# Patient Record
Sex: MALE | Race: WHITE | NOT HISPANIC OR LATINO | Employment: FULL TIME | URBAN - METROPOLITAN AREA
[De-identification: names, ages, dates, MRNs, and addresses within clinical notes are randomized per-mention and may not be internally consistent; named-entity substitution may affect disease eponyms.]

---

## 2021-02-08 ENCOUNTER — IMMUNIZATION (OUTPATIENT)
Dept: VACCINE CLINIC | Facility: HOSPITAL | Age: 31
End: 2021-02-08

## 2021-02-08 PROCEDURE — 91300 HC SARSCOV02 VAC 30MCG/0.3ML IM: CPT | Performed by: INTERNAL MEDICINE

## 2021-02-08 PROCEDURE — 0001A: CPT | Performed by: INTERNAL MEDICINE

## 2021-03-01 ENCOUNTER — IMMUNIZATION (OUTPATIENT)
Dept: VACCINE CLINIC | Facility: HOSPITAL | Age: 31
End: 2021-03-01

## 2021-03-01 PROCEDURE — 91300 HC SARSCOV02 VAC 30MCG/0.3ML IM: CPT | Performed by: INTERNAL MEDICINE

## 2021-03-01 PROCEDURE — 0002A: CPT | Performed by: INTERNAL MEDICINE

## 2022-07-28 ENCOUNTER — APPOINTMENT (OUTPATIENT)
Dept: GENERAL RADIOLOGY | Facility: HOSPITAL | Age: 32
End: 2022-07-28

## 2022-07-28 ENCOUNTER — HOSPITAL ENCOUNTER (EMERGENCY)
Facility: HOSPITAL | Age: 32
Discharge: HOME | End: 2022-07-29
Attending: EMERGENCY MEDICINE | Admitting: EMERGENCY MEDICINE

## 2022-07-28 DIAGNOSIS — I48.91 ATRIAL FIBRILLATION, UNSPECIFIED TYPE: Primary | ICD-10-CM

## 2022-07-28 LAB
ALBUMIN SERPL-MCNC: 4.5 G/DL (ref 3.5–5.2)
ALBUMIN/GLOB SERPL: 2 G/DL
ALP SERPL-CCNC: 76 U/L (ref 39–117)
ALT SERPL W P-5'-P-CCNC: 29 U/L (ref 1–41)
ANION GAP SERPL CALCULATED.3IONS-SCNC: 11.4 MMOL/L (ref 5–15)
AST SERPL-CCNC: 33 U/L (ref 1–40)
BASOPHILS # BLD AUTO: 0.07 10*3/MM3 (ref 0–0.2)
BASOPHILS NFR BLD AUTO: 0.5 % (ref 0–1.5)
BILIRUB SERPL-MCNC: 0.6 MG/DL (ref 0–1.2)
BUN SERPL-MCNC: 16 MG/DL (ref 6–20)
BUN/CREAT SERPL: 15.8 (ref 7–25)
CALCIUM SPEC-SCNC: 9.1 MG/DL (ref 8.6–10.5)
CHLORIDE SERPL-SCNC: 104 MMOL/L (ref 98–107)
CO2 SERPL-SCNC: 24.6 MMOL/L (ref 22–29)
CREAT SERPL-MCNC: 1.01 MG/DL (ref 0.76–1.27)
DEPRECATED RDW RBC AUTO: 37.2 FL (ref 37–54)
EGFRCR SERPLBLD CKD-EPI 2021: 100.7 ML/MIN/1.73
EOSINOPHIL # BLD AUTO: 0.26 10*3/MM3 (ref 0–0.4)
EOSINOPHIL NFR BLD AUTO: 2 % (ref 0.3–6.2)
ERYTHROCYTE [DISTWIDTH] IN BLOOD BY AUTOMATED COUNT: 12.9 % (ref 12.3–15.4)
GLOBULIN UR ELPH-MCNC: 2.3 GM/DL
GLUCOSE SERPL-MCNC: 115 MG/DL (ref 65–99)
HCT VFR BLD AUTO: 46.2 % (ref 37.5–51)
HGB BLD-MCNC: 16.1 G/DL (ref 13–17.7)
IMM GRANULOCYTES # BLD AUTO: 0.08 10*3/MM3 (ref 0–0.05)
IMM GRANULOCYTES NFR BLD AUTO: 0.6 % (ref 0–0.5)
LYMPHOCYTES # BLD AUTO: 2.42 10*3/MM3 (ref 0.7–3.1)
LYMPHOCYTES NFR BLD AUTO: 18.6 % (ref 19.6–45.3)
MAGNESIUM SERPL-MCNC: 2.1 MG/DL (ref 1.6–2.6)
MCH RBC QN AUTO: 28.5 PG (ref 26.6–33)
MCHC RBC AUTO-ENTMCNC: 34.8 G/DL (ref 31.5–35.7)
MCV RBC AUTO: 81.9 FL (ref 79–97)
MONOCYTES # BLD AUTO: 1.04 10*3/MM3 (ref 0.1–0.9)
MONOCYTES NFR BLD AUTO: 8 % (ref 5–12)
NEUTROPHILS NFR BLD AUTO: 70.3 % (ref 42.7–76)
NEUTROPHILS NFR BLD AUTO: 9.13 10*3/MM3 (ref 1.7–7)
NRBC BLD AUTO-RTO: 0.1 /100 WBC (ref 0–0.2)
PLATELET # BLD AUTO: 282 10*3/MM3 (ref 140–450)
PMV BLD AUTO: 10.5 FL (ref 6–12)
POTASSIUM SERPL-SCNC: 3.7 MMOL/L (ref 3.5–5.2)
PROT SERPL-MCNC: 6.8 G/DL (ref 6–8.5)
RBC # BLD AUTO: 5.64 10*6/MM3 (ref 4.14–5.8)
SODIUM SERPL-SCNC: 140 MMOL/L (ref 136–145)
TROPONIN T SERPL-MCNC: <0.01 NG/ML (ref 0–0.03)
WBC NRBC COR # BLD: 13 10*3/MM3 (ref 3.4–10.8)

## 2022-07-28 PROCEDURE — 93005 ELECTROCARDIOGRAM TRACING: CPT | Performed by: EMERGENCY MEDICINE

## 2022-07-28 PROCEDURE — 71045 X-RAY EXAM CHEST 1 VIEW: CPT

## 2022-07-28 PROCEDURE — 93010 ELECTROCARDIOGRAM REPORT: CPT | Performed by: INTERNAL MEDICINE

## 2022-07-28 PROCEDURE — 84484 ASSAY OF TROPONIN QUANT: CPT | Performed by: EMERGENCY MEDICINE

## 2022-07-28 PROCEDURE — 80053 COMPREHEN METABOLIC PANEL: CPT | Performed by: EMERGENCY MEDICINE

## 2022-07-28 PROCEDURE — 99284 EMERGENCY DEPT VISIT MOD MDM: CPT

## 2022-07-28 PROCEDURE — 36415 COLL VENOUS BLD VENIPUNCTURE: CPT

## 2022-07-28 PROCEDURE — 85025 COMPLETE CBC W/AUTO DIFF WBC: CPT | Performed by: EMERGENCY MEDICINE

## 2022-07-28 PROCEDURE — 83735 ASSAY OF MAGNESIUM: CPT | Performed by: EMERGENCY MEDICINE

## 2022-07-28 RX ORDER — TRIAMCINOLONE ACETONIDE 55 UG/1
2 SPRAY, METERED NASAL DAILY
COMMUNITY

## 2022-07-28 RX ORDER — CETIRIZINE HYDROCHLORIDE 10 MG/1
10 TABLET ORAL DAILY
COMMUNITY

## 2022-07-28 RX ORDER — SODIUM CHLORIDE 0.9 % (FLUSH) 0.9 %
10 SYRINGE (ML) INJECTION AS NEEDED
Status: DISCONTINUED | OUTPATIENT
Start: 2022-07-28 | End: 2022-07-29 | Stop reason: HOSPADM

## 2022-07-29 VITALS
BODY MASS INDEX: 27.77 KG/M2 | OXYGEN SATURATION: 94 % | RESPIRATION RATE: 18 BRPM | HEIGHT: 78 IN | TEMPERATURE: 97 F | HEART RATE: 65 BPM | DIASTOLIC BLOOD PRESSURE: 77 MMHG | SYSTOLIC BLOOD PRESSURE: 110 MMHG | WEIGHT: 240 LBS

## 2022-07-29 LAB
HOLD SPECIMEN: NORMAL
QT INTERVAL: 337 MS
QT INTERVAL: 355 MS
TROPONIN T SERPL-MCNC: <0.01 NG/ML (ref 0–0.03)
WHOLE BLOOD HOLD SPECIMEN: NORMAL

## 2022-07-29 PROCEDURE — 93005 ELECTROCARDIOGRAM TRACING: CPT | Performed by: EMERGENCY MEDICINE

## 2022-07-29 PROCEDURE — 93010 ELECTROCARDIOGRAM REPORT: CPT | Performed by: INTERNAL MEDICINE

## 2022-07-29 PROCEDURE — 84484 ASSAY OF TROPONIN QUANT: CPT | Performed by: EMERGENCY MEDICINE

## 2022-08-11 ENCOUNTER — OFFICE VISIT (OUTPATIENT)
Dept: CARDIOLOGY | Facility: CLINIC | Age: 32
End: 2022-08-11

## 2022-08-11 VITALS
HEIGHT: 78 IN | DIASTOLIC BLOOD PRESSURE: 78 MMHG | HEART RATE: 63 BPM | BODY MASS INDEX: 28.11 KG/M2 | WEIGHT: 243 LBS | SYSTOLIC BLOOD PRESSURE: 126 MMHG

## 2022-08-11 DIAGNOSIS — I48.0 AF (PAROXYSMAL ATRIAL FIBRILLATION): Primary | ICD-10-CM

## 2022-08-11 PROCEDURE — 99204 OFFICE O/P NEW MOD 45 MIN: CPT | Performed by: INTERNAL MEDICINE

## 2022-08-11 PROCEDURE — 93000 ELECTROCARDIOGRAM COMPLETE: CPT | Performed by: INTERNAL MEDICINE

## 2022-08-11 NOTE — PROGRESS NOTES
Date of Office Visit: 2022  Encounter Provider: Aleksey Ryan MD  Place of Service: [facility] CARDIOLOGY  Patient Name: Wilian Merritt  :1990    Chief Complaint   Patient presents with   • Atrial Fibrillation     New Patient Consult   • Palpitations   :     HPI: Wilian Merritt is a 32 y.o. male who presents today for atrial fibrillation.    The patient was playing a game of softball, when he noticed he felt very short of breath after running the bases.  He checked his apple watch and was in atrial fibrillation.  This was approximately at 6 PM.  He presented to the emergency room, and EKG confirmed atrial fibrillation.  His [id number] score is 0, so no anticoagulation was recommended, and he was advised to follow-up in cardiology clinic.  He converted to sinus rhythm in the next day.    He is not aware of any family history of atrial fibrillation or heart arrhythmias.  He is not aware of any family history of inherited conditions.    He is extremely tall, which she reports is somewhat of an outlier for his family.  He is 6 7.    He is not aware of any health conditions other than asthma    He denies any similar episodes before or since.          Past Medical History:   Diagnosis Date   • Asthma        Past Surgical History:   Procedure Laterality Date   • MANDIBLE SURGERY     • SHOULDER SURGERY     • WRIST SURGERY         Social History     Socioeconomic History   • Marital status:    Tobacco Use   • Smoking status: Never Smoker   • Smokeless tobacco: Never Used   Vaping Use   • Vaping Use: Never used   Substance and Sexual Activity   • Alcohol use: Yes     Comment: socially // caffeine 10-20oz daily   • Drug use: Never   • Sexual activity: Yes     Partners: Female       History reviewed. No pertinent family history.    Review of Systems   Constitutional: Negative for malaise/fatigue.   HENT: Negative.    Eyes: Negative.    Cardiovascular: Negative for chest  "pain, dyspnea on exertion, leg swelling and near-syncope.   Respiratory: Negative for cough and shortness of breath.    Endocrine: Negative.    Hematologic/Lymphatic: Negative.    Skin: Negative.    Musculoskeletal: Negative.    Gastrointestinal: Negative.    Genitourinary: Negative.    Neurological: Negative.  Negative for weakness.   Psychiatric/Behavioral: Negative.    Allergic/Immunologic: Negative.        No Known Allergies      Current Outpatient Medications:   •  cetirizine (zyrTEC) 10 MG tablet, Take 10 mg by mouth Daily., Disp: , Rfl:   •  Triamcinolone Acetonide (NASACORT) 55 MCG/ACT nasal inhaler, 2 sprays into the nostril(s) as directed by provider Daily., Disp: , Rfl:       Objective:     Vitals:    08/11/22 0847   BP: 126/78   Pulse: 63   Weight: 110 kg (243 lb)   Height: 200.7 cm (79\")     Body mass index is 27.38 kg/m².    PHYSICAL EXAM:    Vitals and nursing note reviewed.   Constitutional:       Appearance: Normal and healthy appearance.   HENT:      Head: Normocephalic and atraumatic.    Mouth/Throat:      Pharynx: Oropharynx is clear.   Pulmonary:      Effort: Pulmonary effort is normal.      Breath sounds: Normal breath sounds.   Cardiovascular:      PMI at left midclavicular line. Normal rate. Regular rhythm.   Edema:     Peripheral edema absent.   Skin:     General: Skin is warm.   Neurological:      General: No focal deficit present.      Mental Status: Alert, oriented to person, place, and time and oriented to person, place and time.   Psychiatric:         Attention and Perception: Attention and perception normal.         Mood and Affect: Mood and affect normal.         Behavior: Behavior is cooperative.             ECG 12 Lead    Date/Time: 8/11/2022 9:43 AM  Performed by: Aleksey Ryan MD  Authorized by: Aleksey Ryan MD   Comparison: compared with previous ECG from 8/11/2022  Similar to previous ECG  Rhythm: sinus rhythm        I reviewed his emergency room note.    I " reviewed his labs.  They were only notable for a mildly elevated white count.      Assessment:       Diagnosis Plan   1. AF (paroxysmal atrial fibrillation) ([facility])  Adult Transthoracic Echo Complete w/ Color, Spectral and Contrast if Necessary Per Protocol          Plan:       Paroxysmal atrial fibrillation, 1 episode.  [id number] score of 0.  Moderate symptoms.  We will continue observation for now.  We will get an echo to rule out structural heart disease.  He is extremely tall, and have some findings which could be suggestive of Marfan syndrome.  I am going to get genetic testing for him.    As always, it has been a pleasure to participate in your patient's care.      Sincerely,         Aleksey Ryan MD

## 2022-08-25 ENCOUNTER — HOSPITAL ENCOUNTER (OUTPATIENT)
Dept: CARDIOLOGY | Facility: HOSPITAL | Age: 32
Discharge: HOME | End: 2022-08-25

## 2022-08-25 VITALS
BODY MASS INDEX: 28.11 KG/M2 | SYSTOLIC BLOOD PRESSURE: 116 MMHG | DIASTOLIC BLOOD PRESSURE: 72 MMHG | WEIGHT: 243 LBS | HEART RATE: 62 BPM | HEIGHT: 78 IN

## 2022-08-25 DIAGNOSIS — I48.0 AF (PAROXYSMAL ATRIAL FIBRILLATION): ICD-10-CM

## 2022-08-25 LAB
AORTIC ARCH: 2.8 CM
ASCENDING AORTA: 3.3 CM
BH CV ECHO MEAS - ACS: 2.7 CM
BH CV ECHO MEAS - AO MAX PG: 6.5 MMHG
BH CV ECHO MEAS - AO MEAN PG: 3.4 MMHG
BH CV ECHO MEAS - AO ROOT DIAM: 3.9 CM
BH CV ECHO MEAS - AO V2 MAX: 127.6 CM/SEC
BH CV ECHO MEAS - AO V2 VTI: 25.2 CM
BH CV ECHO MEAS - AVA(I,D): 4.8 CM2
BH CV ECHO MEAS - EDV(CUBED): 181.2 ML
BH CV ECHO MEAS - EDV(MOD-SP2): 187 ML
BH CV ECHO MEAS - EDV(MOD-SP4): 177 ML
BH CV ECHO MEAS - EF(MOD-BP): 68.7 %
BH CV ECHO MEAS - EF(MOD-SP2): 69 %
BH CV ECHO MEAS - EF(MOD-SP4): 67.8 %
BH CV ECHO MEAS - ESV(CUBED): 30.2 ML
BH CV ECHO MEAS - ESV(MOD-SP2): 58 ML
BH CV ECHO MEAS - ESV(MOD-SP4): 57 ML
BH CV ECHO MEAS - FS: 44.9 %
BH CV ECHO MEAS - IVS/LVPW: 0.94 CM
BH CV ECHO MEAS - IVSD: 0.89 CM
BH CV ECHO MEAS - LAT PEAK E' VEL: 19.5 CM/SEC
BH CV ECHO MEAS - LV DIASTOLIC VOL/BSA (35-75): 71.5 CM2
BH CV ECHO MEAS - LV MASS(C)D: 200.6 GRAMS
BH CV ECHO MEAS - LV MAX PG: 3.8 MMHG
BH CV ECHO MEAS - LV MEAN PG: 2.24 MMHG
BH CV ECHO MEAS - LV SYSTOLIC VOL/BSA (12-30): 23 CM2
BH CV ECHO MEAS - LV V1 MAX: 97.3 CM/SEC
BH CV ECHO MEAS - LV V1 VTI: 21.5 CM
BH CV ECHO MEAS - LVIDD: 5.7 CM
BH CV ECHO MEAS - LVIDS: 3.1 CM
BH CV ECHO MEAS - LVOT AREA: 5.6 CM2
BH CV ECHO MEAS - LVOT DIAM: 2.7 CM
BH CV ECHO MEAS - LVPWD: 0.95 CM
BH CV ECHO MEAS - MED PEAK E' VEL: 13.5 CM/SEC
BH CV ECHO MEAS - MR MAX PG: 40 MMHG
BH CV ECHO MEAS - MR MAX VEL: 316.3 CM/SEC
BH CV ECHO MEAS - MV A DUR: 0.11 SEC
BH CV ECHO MEAS - MV A MAX VEL: 47.6 CM/SEC
BH CV ECHO MEAS - MV DEC SLOPE: 371.7 CM/SEC2
BH CV ECHO MEAS - MV DEC TIME: 0.21 MSEC
BH CV ECHO MEAS - MV E MAX VEL: 90.8 CM/SEC
BH CV ECHO MEAS - MV E/A: 1.91
BH CV ECHO MEAS - MV MAX PG: 4.6 MMHG
BH CV ECHO MEAS - MV MEAN PG: 0.98 MMHG
BH CV ECHO MEAS - MV P1/2T: 84.5 MSEC
BH CV ECHO MEAS - MV V2 VTI: 35 CM
BH CV ECHO MEAS - MVA(P1/2T): 2.6 CM2
BH CV ECHO MEAS - MVA(VTI): 3.4 CM2
BH CV ECHO MEAS - PA ACC TIME: 0.12 SEC
BH CV ECHO MEAS - PA PR(ACCEL): 23.1 MMHG
BH CV ECHO MEAS - PA V2 MAX: 105.9 CM/SEC
BH CV ECHO MEAS - PULM A REVS DUR: 0.13 SEC
BH CV ECHO MEAS - PULM A REVS VEL: 27.5 CM/SEC
BH CV ECHO MEAS - PULM DIAS VEL: 47.9 CM/SEC
BH CV ECHO MEAS - PULM S/D: 0.91
BH CV ECHO MEAS - PULM SYS VEL: 43.4 CM/SEC
BH CV ECHO MEAS - QP/QS: 0.83
BH CV ECHO MEAS - RV MAX PG: 2.02 MMHG
BH CV ECHO MEAS - RV V1 MAX: 71.1 CM/SEC
BH CV ECHO MEAS - RV V1 VTI: 18.1 CM
BH CV ECHO MEAS - RVOT DIAM: 2.7 CM
BH CV ECHO MEAS - SI(MOD-SP2): 52.1 ML/M2
BH CV ECHO MEAS - SI(MOD-SP4): 48.5 ML/M2
BH CV ECHO MEAS - SUP REN AO DIAM: 2.2 CM
BH CV ECHO MEAS - SV(LVOT): 120.3 ML
BH CV ECHO MEAS - SV(MOD-SP2): 129 ML
BH CV ECHO MEAS - SV(MOD-SP4): 120 ML
BH CV ECHO MEAS - SV(RVOT): 100 ML
BH CV ECHO MEAS - TAPSE (>1.6): 3.1 CM
BH CV ECHO MEASUREMENTS AVERAGE E/E' RATIO: 5.5
BH CV XLRA - RV BASE: 3 CM
BH CV XLRA - RV LENGTH: 9.7 CM
BH CV XLRA - RV MID: 2.7 CM
BH CV XLRA - TDI S': 15.7 CM/SEC
LEFT ATRIUM VOLUME INDEX: 22.5 ML/M2
LV EF 2D ECHO EST: 69 %
MAXIMAL PREDICTED HEART RATE: 187 BPM
SINUS: 3.5 CM
STJ: 3.1 CM
STRESS TARGET HR: 159 BPM

## 2022-08-25 PROCEDURE — 93306 TTE W/DOPPLER COMPLETE: CPT | Performed by: INTERNAL MEDICINE

## 2022-08-25 PROCEDURE — 93306 TTE W/DOPPLER COMPLETE: CPT

## 2022-08-26 ENCOUNTER — TELEPHONE (OUTPATIENT)
Dept: CARDIOLOGY | Facility: CLINIC | Age: 32
End: 2022-08-26

## 2022-08-26 NOTE — TELEPHONE ENCOUNTER
----- Message from DREAD Zacarias sent at 8/26/2022  3:09 PM EDT -----  Completely normal echo  ----- Message -----  From: Basia Zamora MD  Sent: 8/25/2022  12:43 PM EDT  To: Aleksey Ryan MD

## 2022-09-13 ENCOUNTER — TELEPHONE (OUTPATIENT)
Dept: CARDIOLOGY | Facility: CLINIC | Age: 32
End: 2022-09-13

## 2023-08-15 ENCOUNTER — OFFICE VISIT (OUTPATIENT)
Dept: CARDIOLOGY | Facility: CLINIC | Age: 33
End: 2023-08-15
Payer: COMMERCIAL

## 2023-08-15 VITALS
SYSTOLIC BLOOD PRESSURE: 130 MMHG | BODY MASS INDEX: 26.27 KG/M2 | HEIGHT: 78 IN | HEART RATE: 53 BPM | DIASTOLIC BLOOD PRESSURE: 86 MMHG | WEIGHT: 227 LBS

## 2023-08-15 DIAGNOSIS — R94.31 ABNORMAL EKG: Primary | ICD-10-CM

## 2023-08-15 NOTE — PROGRESS NOTES
Date of Office Visit: 08/15/2023  Encounter Provider: DREAD Gomez  Place of Service: [facility] CARDIOLOGY  Patient Name: Wilian Merritt  :1990    Chief Complaint   Patient presents with    paroxysmal AFIB   :     HPI: Wilian Merritt is a 33 y.o. male who follows with Dr. Ryan. He has one lifetime episode of atrial fibrillation with no recurrence.     Saw Dr. Ryan last year after he went to ED with sudden onset of palpitations while running bases during softball.     EKG in ED showed AF. Converted with IV BB.     Saw Dr. Ryan in follow up. No recurrence. Echo showed normal structural heart disease.                   Presents today for follow up appt.     Since last visit he has done well.     He has not had any recurrent AF.     He has no complaints or concerns in office.     Echo showed normal LVEF.     He exercises without issues.     EKG shows NSR.      Past Medical History:   Diagnosis Date    Asthma        Past Surgical History:   Procedure Laterality Date    MANDIBLE SURGERY      SHOULDER SURGERY      WRIST SURGERY         Social History     Socioeconomic History    Marital status:    Tobacco Use    Smoking status: Never    Smokeless tobacco: Never   Vaping Use    Vaping Use: Never used   Substance and Sexual Activity    Alcohol use: Yes     Comment: socially // caffeine 10-20oz daily    Drug use: Never    Sexual activity: Yes     Partners: Female       Family History   Problem Relation Age of Onset    No Known Problems Mother     No Known Problems Father     No Known Problems Sister     No Known Problems Brother     No Known Problems Son        Review of Systems   Constitutional: Negative for chills, fever and malaise/fatigue.   Cardiovascular:  Negative for chest pain, dyspnea on exertion, leg swelling, near-syncope, orthopnea, palpitations, paroxysmal nocturnal dyspnea and syncope.   Respiratory:  Negative for cough and shortness of breath.   "  Hematologic/Lymphatic: Negative.    Musculoskeletal:  Negative for joint pain, joint swelling and myalgias.   Gastrointestinal:  Negative for abdominal pain, diarrhea, melena, nausea and vomiting.   Genitourinary:  Negative for frequency and hematuria.   Neurological:  Negative for light-headedness, numbness, paresthesias and seizures.   Allergic/Immunologic: Negative.    All other systems reviewed and are negative.    No Known Allergies      Current Outpatient Medications:     Triamcinolone Acetonide (NASACORT) 55 MCG/ACT nasal inhaler, 2 sprays into the nostril(s) as directed by provider Daily., Disp: , Rfl:     cetirizine (zyrTEC) 10 MG tablet, Take 1 tablet by mouth Daily. (Patient not taking: Reported on 8/15/2023), Disp: , Rfl:       Objective:     Vitals:    08/15/23 1030   BP: 130/86   Pulse: 53   Weight: 103 kg (227 lb)   Height: 200.7 cm (79\")     Body mass index is 25.57 kg/mý.    PHYSICAL EXAM:    Vitals Reviewed.   General Appearance: No acute distress, well developed and well nourished.   Eyes: Conjunctiva and lids: No erythema, swelling, or discharge. Sclera non-icteric.   HENT: Atraumatic, normocephalic. External eyes, ears, and nose normal.   Respiratory: No signs of respiratory distress. Respiration rhythm and depth normal.   Clear to auscultation. No rales, crackles, rhonchi, or wheezing auscultated.   Cardiovascular:  Heart Rate and Rhythm: Normal, Heart Sounds: Normal S1 and S2. No S3 or S4 noted.  Gastrointestinal:  Abdomen soft, non-distended, non-tender.   Musculoskeletal: Normal movement of extremities  Skin: Warm and dry.   Psychiatric: Patient alert and oriented to person, place, and time. Speech and behavior appropriate. Normal mood and affect.       ECG 12 Lead    Date/Time: 8/15/2023 3:13 PM  Performed by: Zaire Andres APRN  Authorized by: Zaire Andres APRN   Comparison: compared with previous ECG   Similar to previous ECG  Rhythm: sinus rhythm          Assessment:       " Diagnosis Plan   1. Abnormal EKG               Plan:   PAF--- he had one episode of AF in 2022 during softball/exertion. No recurrence since. Echo was normal. He has no complaints or concerns.       Follow up in one year.         As always, it has been a pleasure to participate in your patient's care.      Sincerely,         DREAD Gomez

## 2024-01-16 ENCOUNTER — OFFICE VISIT (OUTPATIENT)
Dept: INTERNAL MEDICINE | Facility: CLINIC | Age: 34
End: 2024-01-16
Payer: COMMERCIAL

## 2024-01-16 VITALS
WEIGHT: 240.8 LBS | SYSTOLIC BLOOD PRESSURE: 122 MMHG | BODY MASS INDEX: 27.86 KG/M2 | HEART RATE: 94 BPM | HEIGHT: 78 IN | DIASTOLIC BLOOD PRESSURE: 80 MMHG | TEMPERATURE: 98 F | OXYGEN SATURATION: 97 %

## 2024-01-16 DIAGNOSIS — J45.40 MODERATE PERSISTENT ASTHMA WITHOUT COMPLICATION: ICD-10-CM

## 2024-01-16 DIAGNOSIS — Z13.228 SCREENING FOR METABOLIC DISORDER: ICD-10-CM

## 2024-01-16 DIAGNOSIS — Z00.00 ENCOUNTER FOR ANNUAL PHYSICAL EXAM: Primary | ICD-10-CM

## 2024-01-16 DIAGNOSIS — Z13.220 SCREENING FOR CHOLESTEROL LEVEL: ICD-10-CM

## 2024-01-16 DIAGNOSIS — Z11.59 ENCOUNTER FOR HEPATITIS C SCREENING TEST FOR LOW RISK PATIENT: ICD-10-CM

## 2024-01-16 DIAGNOSIS — Z83.49 FAMILY HISTORY OF GRAVES' DISEASE: ICD-10-CM

## 2024-01-16 PROCEDURE — 99395 PREV VISIT EST AGE 18-39: CPT | Performed by: STUDENT IN AN ORGANIZED HEALTH CARE EDUCATION/TRAINING PROGRAM

## 2024-01-16 RX ORDER — ALBUTEROL SULFATE 90 UG/1
2 AEROSOL, METERED RESPIRATORY (INHALATION) EVERY 4 HOURS PRN
Qty: 6.7 G | Refills: 3 | Status: SHIPPED | OUTPATIENT
Start: 2024-01-16

## 2024-01-16 NOTE — ASSESSMENT & PLAN NOTE
Healthcare Maintenance   Vaccines: recommend influenza. Will discuss PCV at next appt given asthma hx  -PHQ score: 0  -Counseled on importance of maintaining healthy diet and routine exercise. Encouraged 150 min exercise per week.  -Blood pressure goal: <130/80  -Metabolic screening: CMP, A1c, lipid today

## 2024-01-16 NOTE — PROGRESS NOTES
New Patient Office Visit      Patient Name: Wilian Merritt  : 1990   MRN: [id number]   Care Team: Patient Care Team:  Ramon aCstelan MD as PCP - General (Internal Medicine)    Chief Complaint:    Chief Complaint   Patient presents with    Annual Exam    Establish Care    Asthma       History of Present Illness: Wilian Merritt is a 33 y.o. male who is here today as a new patient to establish care for his annual physical and for management of chronic asthma    He was previously on Breo-Ellipta for his asthma and has not been on this for the past year after moving from [place] to [place]. He reports not having any recent exacerbations, and has previous albuterol inhaler however has not had to use it and believes it is likely .     Medical history is otherwise notable for isolated episode of atrial fibrillation in , which resolved spontaneously and had a follow-up with cardiology with echo that showed no evidence of structural heart disease.  Has not had any recurrence of symptoms      Subjective      Review of Systems:   Review of Systems   Constitutional:  Negative for chills, fever and unexpected weight loss.   HENT:  Negative for congestion, postnasal drip and trouble swallowing.    Eyes:  Negative for blurred vision, double vision and pain.   Respiratory:          Hx of asthma   Cardiovascular:  Negative for chest pain and palpitations.   Gastrointestinal:  Negative for abdominal pain, blood in stool, diarrhea, nausea and vomiting.   Genitourinary:  Negative for dysuria, hematuria and urgency.   Musculoskeletal:  Negative for arthralgias, joint swelling and myalgias.   Skin:  Negative for rash, skin lesions and wound.   Neurological:  Negative for dizziness, weakness and headache.   Psychiatric/Behavioral:  Negative for depressed mood and stress. The patient is not nervous/anxious.     - See HPI    Past Medical History:   Past Medical History:   Diagnosis Date    Asthma        Past  "Surgical History:   Past Surgical History:   Procedure Laterality Date    MANDIBLE SURGERY      SHOULDER SURGERY      WRIST SURGERY         Family History:   Family History   Problem Relation Age of Onset    No Known Problems Mother     No Known Problems Father     No Known Problems Sister     No Known Problems Brother     No Known Problems Son        Social History:   Social History     Socioeconomic History    Marital status:    Tobacco Use    Smoking status: Never     Passive exposure: Never    Smokeless tobacco: Never   Vaping Use    Vaping Use: Never used   Substance and Sexual Activity    Alcohol use: Yes     Comment: socially // caffeine 10-20oz daily    Drug use: Never    Sexual activity: Yes     Partners: Female       Tobacco History:   Social History     Tobacco Use   Smoking Status Never    Passive exposure: Never   Smokeless Tobacco Never       Medications:     Current Outpatient Medications:     Triamcinolone Acetonide (NASACORT) 55 MCG/ACT nasal inhaler, 2 sprays into the nostril(s) as directed by provider Daily., Disp: , Rfl:     albuterol sulfate  (90 Base) MCG/ACT inhaler, Inhale 2 puffs Every 4 (Four) Hours As Needed for Wheezing., Disp: 6.7 g, Rfl: 3    Fluticasone Furoate-Vilanterol 50-25 MCG/ACT aerosol powder , Inhale 1 puff Daily., Disp: 60 each, Rfl: 3    Allergies:   No Known Allergies    Objective     Physical Exam:  Vital Signs:   Vitals:    01/16/24 1504   BP: 122/80   Pulse: 94   Temp: 98 °F (36.7 °C)   TempSrc: Temporal   SpO2: 97%   Weight: 109 kg (240 lb 12.8 oz)   Height: 200.7 cm (79\")     Body mass index is 27.13 kg/m².       Physical Exam  Vitals reviewed.   Constitutional:       General: He is not in acute distress.     Appearance: Normal appearance.   HENT:      Head: Normocephalic and atraumatic.      Right Ear: External ear normal.      Left Ear: External ear normal.      Nose: Nose normal. No rhinorrhea.   Eyes:      General:         Right eye: No discharge.    "      Left eye: No discharge.      Extraocular Movements: Extraocular movements intact.      Conjunctiva/sclera: Conjunctivae normal.   Cardiovascular:      Rate and Rhythm: Normal rate and regular rhythm.      Heart sounds: Normal heart sounds.   Pulmonary:      Effort: Pulmonary effort is normal. No respiratory distress.      Breath sounds: Normal breath sounds. No wheezing.   Abdominal:      General: Abdomen is flat. There is no distension.      Palpations: Abdomen is soft.   Musculoskeletal:         General: No swelling or deformity. Normal range of motion.      Cervical back: Normal range of motion and neck supple.   Skin:     General: Skin is warm and dry.   Neurological:      General: No focal deficit present.      Mental Status: He is alert and oriented to person, place, and time.      Motor: No weakness.      Gait: Gait normal.   Psychiatric:         Mood and Affect: Mood normal.         Behavior: Behavior normal.         Assessment / Plan      Assessment/Plan:   Problems Addressed This Visit  Diagnoses and all orders for this visit:    1. Encounter for annual physical exam (Primary)  Assessment & Plan:  Healthcare Maintenance   Vaccines: recommend influenza. Will discuss PCV at next appt given asthma hx  -PHQ score: 0  -Counseled on importance of maintaining healthy diet and routine exercise. Encouraged 150 min exercise per week.  -Blood pressure goal: <130/80  -Metabolic screening: CMP, A1c, lipid today      Orders:  -     Comprehensive Metabolic Panel  -     Hemoglobin A1c  -     Lipid Panel  -     Hepatitis C Antibody  -     TSH Rfx On Abnormal To Free T4    2. Screening for metabolic disorder  -     Comprehensive Metabolic Panel  -     Hemoglobin A1c    3. Encounter for hepatitis C screening test for low risk patient  -     Hepatitis C Antibody    4. Screening for cholesterol level  -     Lipid Panel    5. Moderate persistent asthma without complication  Assessment & Plan:  Asthma is  chronic, no recent  exacerbations however reported persistent sx in past responsive to Breo-Ellipta .  Will refill Breo-Ellipta and send in albuterol prn as rescue inhaler to be used as needed     Orders:  -     Fluticasone Furoate-Vilanterol 50-25 MCG/ACT aerosol powder ; Inhale 1 puff Daily.  Dispense: 60 each; Refill: 3  -     albuterol sulfate  (90 Base) MCG/ACT inhaler; Inhale 2 puffs Every 4 (Four) Hours As Needed for Wheezing.  Dispense: 6.7 g; Refill: 3    6. Family history of Graves' disease  -     TSH Rfx On Abnormal To Free T4          Plan of care reviewed with patient at the conclusion of today's visit. Education was provided regarding diagnosis and management.  Patient verbalizes understanding of and agreement with management plan.      Follow Up:   Return in about 1 year (around 1/16/2025) for Annual physical.          MD GRIFFIN Herndon [facility]  [address] [place] [phone]  Fax [phone]           Home

## 2024-01-16 NOTE — ASSESSMENT & PLAN NOTE
Asthma is  chronic, no recent exacerbations however reported persistent sx in past responsive to Breo-Ellipta .  Will refill Breo-Ellipta and send in albuterol prn as rescue inhaler to be used as needed

## 2024-01-17 LAB
ALBUMIN SERPL-MCNC: 4.6 G/DL (ref 3.5–5.2)
ALBUMIN/GLOB SERPL: 2 G/DL
ALP SERPL-CCNC: 77 U/L (ref 39–117)
ALT SERPL-CCNC: 13 U/L (ref 1–41)
AST SERPL-CCNC: 19 U/L (ref 1–40)
BILIRUB SERPL-MCNC: 0.6 MG/DL (ref 0–1.2)
BUN SERPL-MCNC: 16 MG/DL (ref 6–20)
BUN/CREAT SERPL: 13.8 (ref 7–25)
CALCIUM SERPL-MCNC: 9.3 MG/DL (ref 8.6–10.5)
CHLORIDE SERPL-SCNC: 103 MMOL/L (ref 98–107)
CHOLEST SERPL-MCNC: 136 MG/DL (ref 0–200)
CO2 SERPL-SCNC: 27 MMOL/L (ref 22–29)
CREAT SERPL-MCNC: 1.16 MG/DL (ref 0.76–1.27)
EGFRCR SERPLBLD CKD-EPI 2021: 84.8 ML/MIN/1.73
GLOBULIN SER CALC-MCNC: 2.3 GM/DL
GLUCOSE SERPL-MCNC: 88 MG/DL (ref 65–99)
HBA1C MFR BLD: 4.9 % (ref 4.8–5.6)
HCV IGG SERPL QL IA: NON REACTIVE
HDLC SERPL-MCNC: 49 MG/DL (ref 40–60)
LDLC SERPL CALC-MCNC: 63 MG/DL (ref 0–100)
POTASSIUM SERPL-SCNC: 4.3 MMOL/L (ref 3.5–5.2)
PROT SERPL-MCNC: 6.9 G/DL (ref 6–8.5)
SODIUM SERPL-SCNC: 142 MMOL/L (ref 136–145)
TRIGL SERPL-MCNC: 136 MG/DL (ref 0–150)
TSH SERPL DL<=0.005 MIU/L-ACNC: 1.4 UIU/ML (ref 0.27–4.2)
VLDLC SERPL CALC-MCNC: 24 MG/DL (ref 5–40)

## 2024-01-23 DIAGNOSIS — J45.40 MODERATE PERSISTENT ASTHMA WITHOUT COMPLICATION: ICD-10-CM

## 2024-01-23 RX ORDER — FLUTICASONE FUROATE AND VILANTEROL 200; 25 UG/1; UG/1
1 POWDER RESPIRATORY (INHALATION)
Qty: 60 EACH | Refills: 3 | Status: SHIPPED | OUTPATIENT
Start: 2024-01-23

## 2024-02-14 ENCOUNTER — OFFICE VISIT (OUTPATIENT)
Dept: INTERNAL MEDICINE | Facility: CLINIC | Age: 34
End: 2024-02-14
Payer: COMMERCIAL

## 2024-02-14 ENCOUNTER — HOSPITAL ENCOUNTER (OUTPATIENT)
Facility: HOSPITAL | Age: 34
Discharge: HOME | End: 2024-02-14
Admitting: STUDENT IN AN ORGANIZED HEALTH CARE EDUCATION/TRAINING PROGRAM
Payer: COMMERCIAL

## 2024-02-14 VITALS
SYSTOLIC BLOOD PRESSURE: 132 MMHG | HEIGHT: 78 IN | OXYGEN SATURATION: 94 % | WEIGHT: 239.8 LBS | HEART RATE: 63 BPM | TEMPERATURE: 98.2 F | BODY MASS INDEX: 27.74 KG/M2 | DIASTOLIC BLOOD PRESSURE: 70 MMHG

## 2024-02-14 DIAGNOSIS — M54.2 NECK PAIN: Primary | ICD-10-CM

## 2024-02-14 PROCEDURE — 99213 OFFICE O/P EST LOW 20 MIN: CPT | Performed by: STUDENT IN AN ORGANIZED HEALTH CARE EDUCATION/TRAINING PROGRAM

## 2024-02-14 PROCEDURE — 72040 X-RAY EXAM NECK SPINE 2-3 VW: CPT

## 2024-02-16 DIAGNOSIS — M54.12 CERVICAL RADICULOPATHY: Primary | ICD-10-CM

## 2024-03-17 ENCOUNTER — HOSPITAL ENCOUNTER (OUTPATIENT)
Facility: HOSPITAL | Age: 34
Discharge: HOME | End: 2024-03-17
Admitting: STUDENT IN AN ORGANIZED HEALTH CARE EDUCATION/TRAINING PROGRAM
Payer: COMMERCIAL

## 2024-03-17 DIAGNOSIS — M54.12 CERVICAL RADICULOPATHY: ICD-10-CM

## 2024-03-17 PROCEDURE — 72141 MRI NECK SPINE W/O DYE: CPT

## 2024-05-08 ENCOUNTER — OFFICE VISIT (OUTPATIENT)
Dept: INTERNAL MEDICINE | Facility: CLINIC | Age: 34
End: 2024-05-08
Payer: COMMERCIAL

## 2024-05-08 VITALS
DIASTOLIC BLOOD PRESSURE: 70 MMHG | HEIGHT: 78 IN | BODY MASS INDEX: 27.47 KG/M2 | TEMPERATURE: 98 F | HEART RATE: 81 BPM | SYSTOLIC BLOOD PRESSURE: 124 MMHG | WEIGHT: 237.4 LBS | OXYGEN SATURATION: 96 %

## 2024-05-08 DIAGNOSIS — R21 RASH AND NONSPECIFIC SKIN ERUPTION: Primary | ICD-10-CM

## 2024-05-08 PROCEDURE — 99213 OFFICE O/P EST LOW 20 MIN: CPT | Performed by: STUDENT IN AN ORGANIZED HEALTH CARE EDUCATION/TRAINING PROGRAM

## 2024-05-08 RX ORDER — CETIRIZINE HYDROCHLORIDE 10 MG/1
10 TABLET ORAL DAILY
COMMUNITY

## 2024-05-08 RX ORDER — TRIAMCINOLONE ACETONIDE 5 MG/G
1 OINTMENT TOPICAL 2 TIMES DAILY
Qty: 30 G | Refills: 2 | Status: SHIPPED | OUTPATIENT
Start: 2024-05-08

## 2024-05-10 DIAGNOSIS — R21 RASH AND NONSPECIFIC SKIN ERUPTION: Primary | ICD-10-CM

## 2024-05-10 RX ORDER — PREDNISONE 10 MG/1
TABLET ORAL
Qty: 10 TABLET | Refills: 0 | Status: SHIPPED | OUTPATIENT
Start: 2024-05-10 | End: 2024-05-11

## 2024-05-10 RX ORDER — TRIAMCINOLONE ACETONIDE 1 MG/G
1 OINTMENT TOPICAL 3 TIMES DAILY
Qty: 453.6 G | Refills: 1 | Status: SHIPPED | OUTPATIENT
Start: 2024-05-10 | End: 2024-05-11

## 2024-05-11 RX ORDER — TRIAMCINOLONE ACETONIDE 1 MG/G
1 OINTMENT TOPICAL 3 TIMES DAILY
Qty: 453.6 G | Refills: 1 | Status: SHIPPED | OUTPATIENT
Start: 2024-05-11

## 2024-05-11 RX ORDER — PREDNISONE 10 MG/1
TABLET ORAL
Qty: 10 TABLET | Refills: 0 | Status: SHIPPED | OUTPATIENT
Start: 2024-05-11

## 2024-06-21 DIAGNOSIS — R94.31 ABNORMAL EKG: Primary | ICD-10-CM

## 2024-08-09 ENCOUNTER — TELEPHONE (OUTPATIENT)
Age: 34
End: 2024-08-09
Payer: COMMERCIAL

## 2024-08-09 NOTE — TELEPHONE ENCOUNTER
----- Message from Sd Pagan sent at 8/9/2024  1:48 PM EDT -----  Can you let him know that his monitor looked good and did not show any arrhythmia for the 10 days that he was wearing it.  His average heart rate is in the 70s and heart rate was going up and down appropriately with activity

## 2024-08-15 ENCOUNTER — OFFICE VISIT (OUTPATIENT)
Age: 34
End: 2024-08-15
Payer: COMMERCIAL

## 2024-08-15 VITALS
SYSTOLIC BLOOD PRESSURE: 130 MMHG | HEIGHT: 78 IN | BODY MASS INDEX: 27.54 KG/M2 | DIASTOLIC BLOOD PRESSURE: 84 MMHG | WEIGHT: 238 LBS | HEART RATE: 67 BPM

## 2024-08-15 DIAGNOSIS — R94.31 ABNORMAL EKG: Primary | ICD-10-CM

## 2024-08-15 PROCEDURE — 93000 ELECTROCARDIOGRAM COMPLETE: CPT

## 2024-08-15 PROCEDURE — 99213 OFFICE O/P EST LOW 20 MIN: CPT

## 2024-08-15 NOTE — PROGRESS NOTES
Date of Office Visit: 08/15/2024  Encounter Provider: DREAD Gomez  Place of Service: [facility] CARDIOLOGY  Patient Name: Wilian Merritt  :1990    Chief Complaint   Patient presents with    Atrial Fibrillation   :     HPI: Wilian Merritt is a 34 y.o. male who follows with Dr. Ryan. He has one lifetime episode of atrial fibrillation with no recurrence.      Saw Dr. Ryan last year after he went to ED with sudden onset of palpitations while running bases during softball.      EKG in ED showed AF. Converted with IV BB.      Saw Dr. Ryan in follow up. No recurrence. Echo showed normal structural heart disease.     He called the office in July with palpitations. Monitor showed no arrythmia that correlated with his symptoms.                 He presents today for follow up appt.     Continues to have occasional palpitaitons lasting seconds.     He had these episodes while wearing the monitor which correlated with NSR.     He has not had any recurrent AF since .     Denies any chest pain, dyspnea, fatigue.     EKG shows NSR.      Past Medical History:   Diagnosis Date    Asthma        Past Surgical History:   Procedure Laterality Date    MANDIBLE SURGERY      SHOULDER SURGERY      WRIST SURGERY         Social History     Socioeconomic History    Marital status:    Tobacco Use    Smoking status: Never     Passive exposure: Never    Smokeless tobacco: Never   Vaping Use    Vaping status: Never Used   Substance and Sexual Activity    Alcohol use: Yes     Comment: socially // caffeine 10-20oz daily    Drug use: Never    Sexual activity: Yes     Partners: Female       Family History   Problem Relation Age of Onset    No Known Problems Mother     No Known Problems Father     No Known Problems Sister     No Known Problems Brother     No Known Problems Son        Review of Systems   Constitutional: Negative for chills, fever and malaise/fatigue.   Cardiovascular:   "Negative for chest pain, dyspnea on exertion, leg swelling, near-syncope, orthopnea, palpitations, paroxysmal nocturnal dyspnea and syncope.   Respiratory:  Negative for cough and shortness of breath.    Hematologic/Lymphatic: Negative.    Musculoskeletal:  Negative for joint pain, joint swelling and myalgias.   Gastrointestinal:  Negative for abdominal pain, diarrhea, melena, nausea and vomiting.   Genitourinary:  Negative for frequency and hematuria.   Neurological:  Negative for light-headedness, numbness, paresthesias and seizures.   Allergic/Immunologic: Negative.    All other systems reviewed and are negative.      No Known Allergies      Current Outpatient Medications:     albuterol sulfate  (90 Base) MCG/ACT inhaler, Inhale 2 puffs Every 4 (Four) Hours As Needed for Wheezing., Disp: 6.7 g, Rfl: 3    cetirizine (zyrTEC) 10 MG tablet, Take 1 tablet by mouth Daily., Disp: , Rfl:     Fluticasone Furoate-Vilanterol (Breo Ellipta) 200-25 MCG/ACT inhaler, Inhale 1 puff Daily., Disp: 60 each, Rfl: 3    Triamcinolone Acetonide (NASACORT) 55 MCG/ACT nasal inhaler, 2 sprays into the nostril(s) as directed by provider Daily., Disp: , Rfl:       Objective:     Vitals:    08/15/24 0939   BP: 130/84   Pulse: 67   Weight: 108 kg (238 lb)   Height: 200.7 cm (79\")     Body mass index is 26.81 kg/m².    PHYSICAL EXAM:    Vitals Reviewed.   General Appearance: No acute distress, well developed and well nourished.   Eyes: Conjunctiva and lids: No erythema, swelling, or discharge. Sclera non-icteric.   HENT: Atraumatic, normocephalic. External eyes, ears, and nose normal.   Respiratory: No signs of respiratory distress. Respiration rhythm and depth normal.   Clear to auscultation. No rales, crackles, rhonchi, or wheezing auscultated.   Cardiovascular:  Heart Rate and Rhythm: Normal, Heart Sounds: Normal S1 and S2. No S3 or S4 noted.  Gastrointestinal:  Abdomen soft, non-distended, non-tender.   Musculoskeletal: Normal " movement of extremities  Skin: Warm and dry.   Psychiatric: Patient alert and oriented to person, place, and time. Speech and behavior appropriate. Normal mood and affect.       ECG 12 Lead    Date/Time: 8/15/2024 10:33 AM  Performed by: Zaire Andres APRN    Authorized by: Zaire Andres APRN  Comparison: compared with previous ECG   Similar to previous ECG  Rhythm: sinus rhythm            Assessment:       Diagnosis Plan   1. Abnormal EKG               Plan:   Abnormal EKG--- he had one episode of AF in 2022 and no recurrence since then. His recent palpitations correlated with NSR. We discussed symptoms to monitor and to call for sustained episodes.       He will follow up in one year.         As always, it has been a pleasure to participate in your patient's care.      Sincerely,         DREAD Gomez

## 2024-09-22 DIAGNOSIS — J45.40 MODERATE PERSISTENT ASTHMA WITHOUT COMPLICATION: ICD-10-CM

## 2024-09-23 RX ORDER — FLUTICASONE FUROATE AND VILANTEROL 200; 25 UG/1; UG/1
1 POWDER RESPIRATORY (INHALATION)
Qty: 60 EACH | Refills: 3 | Status: SHIPPED | OUTPATIENT
Start: 2024-09-23

## 2025-01-17 ENCOUNTER — OFFICE VISIT (OUTPATIENT)
Dept: INTERNAL MEDICINE | Facility: CLINIC | Age: 35
End: 2025-01-17
Payer: COMMERCIAL

## 2025-01-17 VITALS
WEIGHT: 244 LBS | TEMPERATURE: 98.4 F | SYSTOLIC BLOOD PRESSURE: 124 MMHG | BODY MASS INDEX: 28.23 KG/M2 | HEART RATE: 72 BPM | DIASTOLIC BLOOD PRESSURE: 82 MMHG | HEIGHT: 78 IN

## 2025-01-17 DIAGNOSIS — J45.40 MODERATE PERSISTENT ASTHMA WITHOUT COMPLICATION: ICD-10-CM

## 2025-01-17 DIAGNOSIS — Z83.49 FAMILY HISTORY OF GRAVES' DISEASE: ICD-10-CM

## 2025-01-17 DIAGNOSIS — Z00.00 ANNUAL PHYSICAL EXAM: Primary | ICD-10-CM

## 2025-01-17 DIAGNOSIS — R00.2 PALPITATIONS: ICD-10-CM

## 2025-01-17 PROCEDURE — 99395 PREV VISIT EST AGE 18-39: CPT | Performed by: STUDENT IN AN ORGANIZED HEALTH CARE EDUCATION/TRAINING PROGRAM

## 2025-01-17 NOTE — PROGRESS NOTES
"Chief Complaint  Annual Exam    Subjective        Wilian Merritt presents to [facility]  History of Present Illness    Presents to clinic today for annual physical    He is doing well since last visit, has no acute complaints or concerns.  Since last visit he was seen by cardiology as he reports some intermittent, infrequent episodes of palpitations.  Holter monitor demonstrated no signs of A-fib or arrhythmia, he followed up with cardiology who recommended surveillance.  He states these are largely manageable and infrequent, denies any associated dyspnea or chest pain.    He otherwise is doing well, reports will need new prescription for inhaler as PBM for his insurance recently changed      Review of Systems   Constitutional:  Negative for fatigue, fever and unexpected weight change.   HENT:  Negative for congestion and rhinorrhea.    Eyes:  Negative for photophobia and visual disturbance.   Respiratory:  Negative for chest tightness and shortness of breath.    Cardiovascular:  Positive for palpitations. Negative for chest pain.   Gastrointestinal:  Negative for abdominal pain, diarrhea and vomiting.   Endocrine: Negative for polydipsia and polyuria.   Genitourinary:  Negative for difficulty urinating, frequency and hematuria.   Musculoskeletal:  Negative for arthralgias, gait problem and joint swelling.   Skin:  Negative for color change and rash.   Neurological:  Negative for seizures, syncope and headaches.   Psychiatric/Behavioral:  Negative for self-injury and suicidal ideas. The patient is not nervous/anxious.            Objective   Vital Signs:  /82   Pulse 72   Temp 98.4 °F (36.9 °C)   Ht 200.7 cm (79.02\")   Wt 111 kg (244 lb)   BMI 27.48 kg/m²   Estimated body mass index is 27.48 kg/m² as calculated from the following:    Height as of this encounter: 200.7 cm (79.02\").    Weight as of this encounter: 111 kg (244 lb).            Physical Exam  Vitals reviewed. "   Constitutional:       General: He is not in acute distress.     Appearance: Normal appearance.   HENT:      Head: Normocephalic and atraumatic.      Right Ear: External ear normal.      Left Ear: External ear normal.      Nose: Nose normal. No rhinorrhea.   Eyes:      General:         Right eye: No discharge.         Left eye: No discharge.      Extraocular Movements: Extraocular movements intact.      Conjunctiva/sclera: Conjunctivae normal.   Cardiovascular:      Rate and Rhythm: Normal rate and regular rhythm.      Heart sounds: Normal heart sounds.   Pulmonary:      Effort: Pulmonary effort is normal. No respiratory distress.      Breath sounds: Normal breath sounds.   Abdominal:      General: Abdomen is flat. There is no distension.      Palpations: Abdomen is soft.   Musculoskeletal:         General: No swelling or deformity. Normal range of motion.      Cervical back: Normal range of motion and neck supple.   Skin:     General: Skin is warm and dry.   Neurological:      General: No focal deficit present.      Mental Status: He is alert and oriented to person, place, and time. Mental status is at baseline.   Psychiatric:         Mood and Affect: Mood normal.         Behavior: Behavior normal.        Result Review :                Assessment and Plan   Diagnoses and all orders for this visit:    1. Annual physical exam (Primary)    2. Family history of Graves' disease    3. Moderate persistent asthma without complication    4. Palpitations        Immunizations: given asthma hx consider PCV <65.   Cancer screening: N/A due to age  Metabolic Labs: 2024 labs noted well within goal with no signs of metabolic disease. Will plan to repeat q2y for now  Recommend maintaining a healthy lifestyle, rich in whole grains, fruits and vegetables. Limit high saturated fats and processed sugars. Maintain an active lifestyle with goal of 150 min of moderate aerobic exercise per week      Reviewed cardiology and Holter monitor  results, discussed as needed pharmacotherapy if palpitations persist or worsen.  Could benefit from propranolol or low-dose beta-blocker    Did discuss obtaining labs today including TSH given family history and new onset palpitations, declined today but consider in the future    Return to clinic in 1 year for annual physical         Follow Up   Return in about 1 year (around 1/17/2026) for Annual physical.  Patient was given instructions and counseling regarding his condition or for health maintenance advice. Please see specific information pulled into the AVS if appropriate.

## 2025-03-16 DIAGNOSIS — J45.40 MODERATE PERSISTENT ASTHMA WITHOUT COMPLICATION: ICD-10-CM

## 2025-03-17 RX ORDER — FLUTICASONE FUROATE AND VILANTEROL 200; 25 UG/1; UG/1
1 POWDER RESPIRATORY (INHALATION) DAILY
Qty: 60 EACH | Refills: 3 | Status: SHIPPED | OUTPATIENT
Start: 2025-03-17

## 2025-08-13 ENCOUNTER — E-VISIT (OUTPATIENT)
Dept: FAMILY MEDICINE CLINIC | Facility: TELEHEALTH | Age: 35
End: 2025-08-13
Payer: COMMERCIAL

## 2025-08-21 ENCOUNTER — OFFICE VISIT (OUTPATIENT)
Age: 35
End: 2025-08-21
Payer: COMMERCIAL

## 2025-08-21 VITALS
HEART RATE: 64 BPM | BODY MASS INDEX: 27.54 KG/M2 | DIASTOLIC BLOOD PRESSURE: 88 MMHG | HEIGHT: 78 IN | WEIGHT: 238 LBS | SYSTOLIC BLOOD PRESSURE: 134 MMHG

## 2025-08-21 DIAGNOSIS — R94.31 ABNORMAL EKG: Primary | ICD-10-CM

## 2025-08-21 PROCEDURE — 93000 ELECTROCARDIOGRAM COMPLETE: CPT

## 2025-08-21 PROCEDURE — 99213 OFFICE O/P EST LOW 20 MIN: CPT

## 2025-08-28 DIAGNOSIS — J45.40 MODERATE PERSISTENT ASTHMA WITHOUT COMPLICATION: ICD-10-CM

## 2025-08-28 RX ORDER — FLUTICASONE FUROATE AND VILANTEROL 200; 25 UG/1; UG/1
1 POWDER RESPIRATORY (INHALATION) DAILY
Qty: 60 EACH | Refills: 3 | Status: SHIPPED | OUTPATIENT
Start: 2025-08-28